# Patient Record
Sex: MALE | Race: WHITE | NOT HISPANIC OR LATINO | Employment: UNEMPLOYED | URBAN - METROPOLITAN AREA
[De-identification: names, ages, dates, MRNs, and addresses within clinical notes are randomized per-mention and may not be internally consistent; named-entity substitution may affect disease eponyms.]

---

## 2021-05-14 ENCOUNTER — OFFICE VISIT (OUTPATIENT)
Dept: URGENT CARE | Facility: CLINIC | Age: 36
End: 2021-05-14

## 2021-05-14 VITALS
BODY MASS INDEX: 34.57 KG/M2 | OXYGEN SATURATION: 97 % | HEART RATE: 88 BPM | DIASTOLIC BLOOD PRESSURE: 108 MMHG | TEMPERATURE: 97.8 F | RESPIRATION RATE: 16 BRPM | HEIGHT: 71 IN | SYSTOLIC BLOOD PRESSURE: 150 MMHG | WEIGHT: 246.91 LBS

## 2021-05-14 DIAGNOSIS — R03.0 ELEVATED BLOOD PRESSURE READING: ICD-10-CM

## 2021-05-14 DIAGNOSIS — Z02.1 PHYSICAL EXAM, PRE-EMPLOYMENT: ICD-10-CM

## 2021-05-14 PROCEDURE — 8915 PR COMPREHENSIVE PHYSICAL: Performed by: PHYSICIAN ASSISTANT

## 2021-05-14 NOTE — PROGRESS NOTES
Patient is a 35-year-old male who presents to urgent care for preemployment physical.  Patient currently does not take any medications.  He does report smoking for several years-he is trying to cut back at this time.  No prior history of asthma or COPD that he is aware of.  Elevated blood pressure in clinic of which he does report intermittent history of such in the past.  He has never been on medication for this.  He currently does not have a primary care provider.  Vision and physical examination other than central obesity all within normal limits.  Patient was strongly encouraged to follow-up with primary care as he does have strong family history of hypertension along with diabetes.  Patient is cleared without restrictions with recommendation to follow-up with primary care for further evaluation of blood pressure.  Please see scanned document.

## 2023-01-12 ENCOUNTER — APPOINTMENT (OUTPATIENT)
Dept: RADIOLOGY | Facility: MEDICAL CENTER | Age: 38
End: 2023-01-12
Attending: EMERGENCY MEDICINE
Payer: COMMERCIAL

## 2023-01-12 ENCOUNTER — HOSPITAL ENCOUNTER (EMERGENCY)
Facility: MEDICAL CENTER | Age: 38
End: 2023-01-12
Attending: EMERGENCY MEDICINE
Payer: COMMERCIAL

## 2023-01-12 VITALS
HEART RATE: 73 BPM | TEMPERATURE: 97.9 F | SYSTOLIC BLOOD PRESSURE: 174 MMHG | OXYGEN SATURATION: 99 % | RESPIRATION RATE: 51 BRPM | DIASTOLIC BLOOD PRESSURE: 96 MMHG | HEIGHT: 70 IN | BODY MASS INDEX: 31.94 KG/M2 | WEIGHT: 223.11 LBS

## 2023-01-12 DIAGNOSIS — I10 PRIMARY HYPERTENSION: ICD-10-CM

## 2023-01-12 DIAGNOSIS — M25.50 ARTHRALGIA, UNSPECIFIED JOINT: ICD-10-CM

## 2023-01-12 LAB
ALBUMIN SERPL BCP-MCNC: 3.5 G/DL (ref 3.2–4.9)
ALBUMIN/GLOB SERPL: 0.9 G/DL
ALP SERPL-CCNC: 59 U/L (ref 30–99)
ALT SERPL-CCNC: 16 U/L (ref 2–50)
ANION GAP SERPL CALC-SCNC: 8 MMOL/L (ref 7–16)
AST SERPL-CCNC: 19 U/L (ref 12–45)
BASOPHILS # BLD AUTO: 0.7 % (ref 0–1.8)
BASOPHILS # BLD: 0.07 K/UL (ref 0–0.12)
BILIRUB SERPL-MCNC: 0.4 MG/DL (ref 0.1–1.5)
BUN SERPL-MCNC: 15 MG/DL (ref 8–22)
CALCIUM ALBUM COR SERPL-MCNC: 9.1 MG/DL (ref 8.5–10.5)
CALCIUM SERPL-MCNC: 8.7 MG/DL (ref 8.5–10.5)
CHLORIDE SERPL-SCNC: 100 MMOL/L (ref 96–112)
CO2 SERPL-SCNC: 26 MMOL/L (ref 20–33)
CREAT SERPL-MCNC: 0.86 MG/DL (ref 0.5–1.4)
EKG IMPRESSION: NORMAL
EOSINOPHIL # BLD AUTO: 0.24 K/UL (ref 0–0.51)
EOSINOPHIL NFR BLD: 2.5 % (ref 0–6.9)
ERYTHROCYTE [DISTWIDTH] IN BLOOD BY AUTOMATED COUNT: 47.8 FL (ref 35.9–50)
GFR SERPLBLD CREATININE-BSD FMLA CKD-EPI: 113 ML/MIN/1.73 M 2
GLOBULIN SER CALC-MCNC: 3.9 G/DL (ref 1.9–3.5)
GLUCOSE SERPL-MCNC: 114 MG/DL (ref 65–99)
HCT VFR BLD AUTO: 43.3 % (ref 42–52)
HGB BLD-MCNC: 14 G/DL (ref 14–18)
IMM GRANULOCYTES # BLD AUTO: 0.03 K/UL (ref 0–0.11)
IMM GRANULOCYTES NFR BLD AUTO: 0.3 % (ref 0–0.9)
LYMPHOCYTES # BLD AUTO: 2.78 K/UL (ref 1–4.8)
LYMPHOCYTES NFR BLD: 28.8 % (ref 22–41)
MCH RBC QN AUTO: 29 PG (ref 27–33)
MCHC RBC AUTO-ENTMCNC: 32.3 G/DL (ref 33.7–35.3)
MCV RBC AUTO: 89.6 FL (ref 81.4–97.8)
MONOCYTES # BLD AUTO: 0.89 K/UL (ref 0–0.85)
MONOCYTES NFR BLD AUTO: 9.2 % (ref 0–13.4)
NEUTROPHILS # BLD AUTO: 5.63 K/UL (ref 1.82–7.42)
NEUTROPHILS NFR BLD: 58.5 % (ref 44–72)
NRBC # BLD AUTO: 0 K/UL
NRBC BLD-RTO: 0 /100 WBC
NT-PROBNP SERPL IA-MCNC: 217 PG/ML (ref 0–125)
PLATELET # BLD AUTO: 337 K/UL (ref 164–446)
PMV BLD AUTO: 9.9 FL (ref 9–12.9)
POTASSIUM SERPL-SCNC: 3.9 MMOL/L (ref 3.6–5.5)
PROT SERPL-MCNC: 7.4 G/DL (ref 6–8.2)
RBC # BLD AUTO: 4.83 M/UL (ref 4.7–6.1)
SODIUM SERPL-SCNC: 134 MMOL/L (ref 135–145)
TROPONIN T SERPL-MCNC: 17 NG/L (ref 6–19)
WBC # BLD AUTO: 9.6 K/UL (ref 4.8–10.8)

## 2023-01-12 PROCEDURE — 83880 ASSAY OF NATRIURETIC PEPTIDE: CPT

## 2023-01-12 PROCEDURE — 36415 COLL VENOUS BLD VENIPUNCTURE: CPT

## 2023-01-12 PROCEDURE — 93005 ELECTROCARDIOGRAM TRACING: CPT | Performed by: EMERGENCY MEDICINE

## 2023-01-12 PROCEDURE — 99284 EMERGENCY DEPT VISIT MOD MDM: CPT

## 2023-01-12 PROCEDURE — 84484 ASSAY OF TROPONIN QUANT: CPT

## 2023-01-12 PROCEDURE — 71045 X-RAY EXAM CHEST 1 VIEW: CPT

## 2023-01-12 PROCEDURE — 85025 COMPLETE CBC W/AUTO DIFF WBC: CPT

## 2023-01-12 PROCEDURE — 80053 COMPREHEN METABOLIC PANEL: CPT

## 2023-01-12 PROCEDURE — 94760 N-INVAS EAR/PLS OXIMETRY 1: CPT

## 2023-01-12 RX ORDER — LOSARTAN POTASSIUM 50 MG/1
50 TABLET ORAL DAILY
Qty: 30 TABLET | Refills: 0 | Status: SHIPPED | OUTPATIENT
Start: 2023-01-12

## 2023-01-12 NOTE — ED NOTES
Reviewed discharge instructions, pt verbalized understanding of instructions and medication rx for HTN. States he will schedule follow-up appointment with a PCP to stay on medication. Educated on this. Denies further questions at this time. Pt ambulatory out of ER with steady gait.

## 2023-01-12 NOTE — ED NOTES
PT semi reclined in bed, appears to be sleeping with regular respirations and without signs of distress.

## 2023-01-12 NOTE — ED TRIAGE NOTES
"Chief Complaint   Patient presents with    Hypertension     Pt reports he's had elevated BP for approximately 2 years and does not take any medications for it.     Joint Swelling     In hands bilaterally and knees. Hx of gout.      BP (!) 189/119   Pulse 85   Temp 36 °C (96.8 °F) (Temporal)   Resp 14   Ht 1.778 m (5' 10\")   Wt 101 kg (223 lb 1.7 oz)   SpO2 98%   BMI 32.01 kg/m²     Pt ambulatory to triage for above.   "

## 2023-01-12 NOTE — ED PROVIDER NOTES
"  ER Provider Note    Scribed for Jayant Puga M.d. by Roberto Maciel. 1/12/2023  10:39 AM    Primary Care Provider: Pcp Pt States None  Means of arrival: Walk-in  History obtained from: Patient    CHIEF COMPLAINT  Chief Complaint   Patient presents with    Hypertension     Pt reports he's had elevated BP for approximately 2 years and does not take any medications for it.     Joint Swelling     In hands bilaterally and knees. Hx of gout.      LIMITATION TO HISTORY   Select: : None    HPI  OUTSIDE HISTORIAN(S):  Select: None    EXTERNAL RECORDS REVIEWED  Select: Other None    Rey Quinones is a 37 y.o. male who presents to the ED for shortness of breath onset \"the last couple of days.\" Per patient, he notes that occasionally, his shortness of breath will be exacerbated while walking. He has associated knee and joint swelling,  but denies leg swelling or chest pain. The patient notes that he has hyperlipidemia but has not sought treatment for it. He has a past medical history of gout. Additionally, the patient takes aleve, but not a large amount.      ROS  Pertinent positives include shortness of breath, knee swelling, and joint swelling. Pertinent negatives include no leg swelling or chest pain.  All other systems reviewed and negative.      PAST MEDICAL HISTORY  Past Medical History:   Diagnosis Date    Hx of gout 11/26/2012    Neck mass     x 1.5 years       SURGICAL HISTORY  History reviewed. No pertinent surgical history.    FAMILY HISTORY  Family History   Problem Relation Age of Onset    Diabetes Mother     Hypertension Mother     Diabetes Father        SOCIAL HISTORY   reports that he has been smoking cigarettes. He has a 10.00 pack-year smoking history. He has never used smokeless tobacco. He reports current alcohol use. He reports that he does not currently use drugs.    CURRENT MEDICATIONS  Previous Medications    INDOMETHACIN (INDOCIN) 50 MG CAPS    Take 1 Cap by mouth 3 times a day, with meals. " "      ALLERGIES  Vicodin [hydrocodone-acetaminophen]    PHYSICAL EXAM  BP (!) 189/119   Pulse 85   Temp 36 °C (96.8 °F) (Temporal)   Resp 14   Ht 1.778 m (5' 10\")   Wt 101 kg (223 lb 1.7 oz)   SpO2 98%   BMI 32.01 kg/m²     Nursing note and vitals reviewed.  Constitutional: Well-developed and well-nourished. No distress.   HENT: Head is normocephalic and atraumatic. Oropharynx is clear and moist without exudate or erythema.   Eyes: Pupils are equal, round, and reactive to light. Conjunctiva are normal.   Cardiovascular: Normal rate and regular rhythm. No murmur heard. Normal radial pulses.   Pulmonary/Chest: Breath sounds normal. No wheezes or rales. No chest wall tenderness.   Abdominal: Soft and non-tender. No distention   Musculoskeletal: No significant swelling, redness, or effusion of any major joints, Extremities exhibit normal range of motion. No calf tenderness or palpable cords.   Extremities: Periorbital edema, 1+ bilateral lower extremity edema to the knees  Neurological: Awake, alert and oriented to person, place, and time. No focal deficits noted.  Skin: Skin is warm and dry. No rash.   Psychiatric: Normal mood and affect. Appropriate for clinical situation    DIAGNOSTIC STUDIES & PROCEDURES    Labs:   Results for orders placed or performed during the hospital encounter of 01/12/23   CBC WITH DIFFERENTIAL   Result Value Ref Range    WBC 9.6 4.8 - 10.8 K/uL    RBC 4.83 4.70 - 6.10 M/uL    Hemoglobin 14.0 14.0 - 18.0 g/dL    Hematocrit 43.3 42.0 - 52.0 %    MCV 89.6 81.4 - 97.8 fL    MCH 29.0 27.0 - 33.0 pg    MCHC 32.3 (L) 33.7 - 35.3 g/dL    RDW 47.8 35.9 - 50.0 fL    Platelet Count 337 164 - 446 K/uL    MPV 9.9 9.0 - 12.9 fL    Neutrophils-Polys 58.50 44.00 - 72.00 %    Lymphocytes 28.80 22.00 - 41.00 %    Monocytes 9.20 0.00 - 13.40 %    Eosinophils 2.50 0.00 - 6.90 %    Basophils 0.70 0.00 - 1.80 %    Immature Granulocytes 0.30 0.00 - 0.90 %    Nucleated RBC 0.00 /100 WBC    Neutrophils " (Absolute) 5.63 1.82 - 7.42 K/uL    Lymphs (Absolute) 2.78 1.00 - 4.80 K/uL    Monos (Absolute) 0.89 (H) 0.00 - 0.85 K/uL    Eos (Absolute) 0.24 0.00 - 0.51 K/uL    Baso (Absolute) 0.07 0.00 - 0.12 K/uL    Immature Granulocytes (abs) 0.03 0.00 - 0.11 K/uL    NRBC (Absolute) 0.00 K/uL   COMP METABOLIC PANEL   Result Value Ref Range    Sodium 134 (L) 135 - 145 mmol/L    Potassium 3.9 3.6 - 5.5 mmol/L    Chloride 100 96 - 112 mmol/L    Co2 26 20 - 33 mmol/L    Anion Gap 8.0 7.0 - 16.0    Glucose 114 (H) 65 - 99 mg/dL    Bun 15 8 - 22 mg/dL    Creatinine 0.86 0.50 - 1.40 mg/dL    Calcium 8.7 8.5 - 10.5 mg/dL    AST(SGOT) 19 12 - 45 U/L    ALT(SGPT) 16 2 - 50 U/L    Alkaline Phosphatase 59 30 - 99 U/L    Total Bilirubin 0.4 0.1 - 1.5 mg/dL    Albumin 3.5 3.2 - 4.9 g/dL    Total Protein 7.4 6.0 - 8.2 g/dL    Globulin 3.9 (H) 1.9 - 3.5 g/dL    A-G Ratio 0.9 g/dL   TROPONIN   Result Value Ref Range    Troponin T 17 6 - 19 ng/L   proBrain Natriuretic Peptide, NT   Result Value Ref Range    NT-proBNP 217 (H) 0 - 125 pg/mL   ESTIMATED GFR   Result Value Ref Range    GFR (CKD-EPI) 113 >60 mL/min/1.73 m 2   CORRECTED CALCIUM   Result Value Ref Range    Correct Calcium 9.1 8.5 - 10.5 mg/dL   EKG (NOW)   Result Value Ref Range    Report       [facility] Emergency Dept.    Test Date:  2023  Pt Name:    DUSTIN ROSENTHAL                 Department: ER  MRN:        [id number]                      Room:       [facility]  Gender:     Male                         Technician: [id number]  :        1985                   Requested By:AUDRA KEARNEY  Order #:    [id number]                    [place] MD: ROSS A CAIO, MD    Measurements  Intervals                                Axis  Rate:       67                           P:          54  MN:         145                          QRS:        5  QRSD:       82                           T:          55  QT:         434  QTc:        458    Interpretive Statements  Sinus  rhythm  Consider left ventricular hypertrophy  ST elev, probable normal early repol pattern  Baseline wander in lead(s) V1  No previous ECG available for comparison  Electronically Signed On 1- 13:15:01 PST by AUDRA KEARNEY MD        All labs reviewed by me.    EKG:   I have reviewed the patient's EKG as above.      Radiology:   The attending Emergency Physician has independently interpreted the diagnostic imaging associated with this visit and is awaiting the final reading from the radiologist, which will be displayed below.    DX-CHEST-PORTABLE (1 VIEW)   Final Result      No acute cardiopulmonary abnormality.           COURSE & MEDICAL DECISION MAKING    ED Observation Status? Yes; I am placing the patient in to an observation status due to a diagnostic uncertainty as well as therapeutic intensity. Patient placed in observation status at 10:43 AM, 1/12/2023.     10:39 AM - Patient seen and evaluated at bedside. I informed the patient I will evaluate using blood tests. Patient verbalizes understanding and agreement to this plan of care. Ordered EKG, ProBNP, Troponin, CMP, CBC w/ Diff, and CXR to evaluate. Differential diagnoses include but are not limited to: CHF, pneumonia, pulmonary edema, renal failure, electrolyte abnormality, anemia    11:28 AM - Ordered Corrected Calcium and Estimated GFR to evaluate.     1:23 PM - Patient will be discharged from ED Observation at this time.     1:26 PM - Patient was reevaluated at bedside. Discussed lab and radiology results with the patient and informed them of results. I then informed the patient for plans for discharge. Patient had the opportunity to ask any questions. The plan for discharge was discussed with them and he was told to return for any new or worsening symptoms. He was also informed of the plans for follow up. Patient is understanding and agreeable to the plan for discharge. .        ADDITIONAL PROBLEM LIST AND DISPOSITION  None    I have discussed  management of the patient with the following physicians and GARETH's: None    Discussion of management with other Q or appropriate source(s): Select: None     Escalation of care considered, and ultimately not performed: IV fluids, blood analysis, diagnostic imaging, and acute inpatient care management, however at this time, the patient is most appropriate for outpatient management.    Barriers to care at this time, including but not limited to: Select: None .     Decision tools and prescription drugs considered including, but not limited to: Select: HEART Score of 1 .    The patient will return for new or worsening symptoms and is stable at the time of discharge.    The patient is referred to a primary physician for blood pressure management, diabetic screening, and for all other preventative health concerns.    DISPOSITION:  Patient will be discharged home in stable condition.    FOLLOW UP:  [facility], Emergency Dept  [address] [place] [id number] [phone]    If symptoms worsen      OUTPATIENT MEDICATIONS:  New Prescriptions    LOSARTAN (COZAAR) 50 MG TAB    Take 1 Tablet by mouth every day.        FINAL IMPRESSION   1. Primary hypertension    2. Arthralgia, unspecified joint         Roberto DAVID), am scribing for, and in the presence of, Jayant Puga M.D..    Electronically signed by: Roberto Porter), 1/12/2023    Jayant DAVID M.D. personally performed the services described in this documentation, as scribed by Roberto Maciel in my presence, and it is both accurate and complete. C    The note accurately reflects work and decisions made by me.  Jayant Puga M.D.  1/12/2023  2:37 PM

## 2023-01-16 PROBLEM — I10 PRIMARY HYPERTENSION: Status: ACTIVE | Noted: 2023-01-16

## 2023-01-16 PROBLEM — Z00.00 PREVENTATIVE HEALTH CARE: Status: ACTIVE | Noted: 2023-01-16

## 2023-01-20 ENCOUNTER — TELEPHONE (OUTPATIENT)
Dept: HEALTH INFORMATION MANAGEMENT | Facility: OTHER | Age: 38
End: 2023-01-20